# Patient Record
Sex: FEMALE | Race: AMERICAN INDIAN OR ALASKA NATIVE
[De-identification: names, ages, dates, MRNs, and addresses within clinical notes are randomized per-mention and may not be internally consistent; named-entity substitution may affect disease eponyms.]

---

## 2019-02-23 ENCOUNTER — HOSPITAL ENCOUNTER (EMERGENCY)
Dept: HOSPITAL 5 - ED | Age: 24
Discharge: HOME | End: 2019-02-23
Payer: MEDICAID

## 2019-02-23 VITALS — SYSTOLIC BLOOD PRESSURE: 123 MMHG | DIASTOLIC BLOOD PRESSURE: 72 MMHG

## 2019-02-23 DIAGNOSIS — O99.511: ICD-10-CM

## 2019-02-23 DIAGNOSIS — O23.41: Primary | ICD-10-CM

## 2019-02-23 DIAGNOSIS — N83.202: ICD-10-CM

## 2019-02-23 DIAGNOSIS — O34.81: ICD-10-CM

## 2019-02-23 DIAGNOSIS — Z3A.09: ICD-10-CM

## 2019-02-23 LAB
BACTERIA #/AREA URNS HPF: (no result) /HPF
BASOPHILS # (AUTO): 0 K/MM3 (ref 0–0.1)
BASOPHILS NFR BLD AUTO: 0.1 % (ref 0–1.8)
BILIRUB UR QL STRIP: (no result)
BLOOD UR QL VISUAL: (no result)
BUN SERPL-MCNC: 5 MG/DL (ref 7–17)
BUN/CREAT SERPL: 10 %
CALCIUM SERPL-MCNC: 9.3 MG/DL (ref 8.4–10.2)
EOSINOPHIL # BLD AUTO: 0 K/MM3 (ref 0–0.4)
EOSINOPHIL NFR BLD AUTO: 0.3 % (ref 0–4.3)
HCT VFR BLD CALC: 37.7 % (ref 30.3–42.9)
HEMOLYSIS INDEX: 9
HGB BLD-MCNC: 13.4 GM/DL (ref 10.1–14.3)
HYALINE CASTS #/AREA URNS LPF: 2 /LPF
LYMPHOCYTES # BLD AUTO: 1.5 K/MM3 (ref 1.2–5.4)
LYMPHOCYTES NFR BLD AUTO: 15.6 % (ref 13.4–35)
MCHC RBC AUTO-ENTMCNC: 36 % (ref 30–34)
MCV RBC AUTO: 94 FL (ref 79–97)
MONOCYTES # (AUTO): 0.8 K/MM3 (ref 0–0.8)
MONOCYTES % (AUTO): 8.8 % (ref 0–7.3)
MUCOUS THREADS #/AREA URNS HPF: (no result) /HPF
PH UR STRIP: 5 [PH] (ref 5–7)
PLATELET # BLD: 185 K/MM3 (ref 140–440)
RBC # BLD AUTO: 4.01 M/MM3 (ref 3.65–5.03)
RBC #/AREA URNS HPF: 10 /HPF (ref 0–6)
UROBILINOGEN UR-MCNC: 2 MG/DL (ref ?–2)
WBC #/AREA URNS HPF: 52 /HPF (ref 0–6)

## 2019-02-23 PROCEDURE — 76801 OB US < 14 WKS SINGLE FETUS: CPT

## 2019-02-23 PROCEDURE — 76817 TRANSVAGINAL US OBSTETRIC: CPT

## 2019-02-23 PROCEDURE — 96365 THER/PROPH/DIAG IV INF INIT: CPT

## 2019-02-23 PROCEDURE — 85025 COMPLETE CBC W/AUTO DIFF WBC: CPT

## 2019-02-23 PROCEDURE — 96361 HYDRATE IV INFUSION ADD-ON: CPT

## 2019-02-23 PROCEDURE — 36415 COLL VENOUS BLD VENIPUNCTURE: CPT

## 2019-02-23 PROCEDURE — 81001 URINALYSIS AUTO W/SCOPE: CPT

## 2019-02-23 PROCEDURE — 80048 BASIC METABOLIC PNL TOTAL CA: CPT

## 2019-02-23 PROCEDURE — 99284 EMERGENCY DEPT VISIT MOD MDM: CPT

## 2019-02-23 PROCEDURE — 84702 CHORIONIC GONADOTROPIN TEST: CPT

## 2019-02-23 NOTE — ULTRASOUND REPORT
FINAL REPORT



PROCEDURE:  US OB &lt; = 14 WEEKS FETUS



TECHNIQUE:  Real-time transabdominal sonography of the uterus, placenta, amniotic fluid, adnexa, and 
fetus was performed with image documentation. Measurements were obtained to determine fetal age/size.
 M-mode Doppler was used to document fetal heartbeat. CPT 33104 



HISTORY:  abd pain 



COMPARISON:  No prior studies are available for comparison.



FINDINGS:  

CRL: 31.9 mm, which corresponds to a gestational age of: 10 weeks, 0 days. 



Yolk Sac: Normal.



Embryonic Cardiac Activity: 167 beats per minute



Gestational Sac: Normal.



Amniotic fluid: Normal.



Cervix: Normal.



Right Ovary: Normal.



Left Ovary: There is a 7 millimeter cyst.



Estimated delivery date: 09/21/2019



Uterus and adnexa: Normal.



IMPRESSION:  

Single live intrauterine gestation at approximately 10 weeks. EDC by US 09/21/2019

## 2019-02-23 NOTE — EMERGENCY DEPARTMENT REPORT
ED Abdominal Pain HPI





- General


Chief Complaint: Abdominal Pain


Stated Complaint: EMESIS


Time Seen by Provider: 19 05:16


Source: patient


Mode of arrival: Ambulatory


Limitations: No Limitations





- History of Present Illness


Initial Comments: 


pt is a 22 y/o aaf G1, P0, A0 who presents for abd cramping with urinary 

frequency urgency for 1 week , there is no vaginal bleeding no pelvic pain no 

abnormal vaginal discharge pt states symptoms started after onset or URI 

constisting of head congestion rhinorrhea sinus pressure, and post nasal drip 

last n/v yesterday , n/v is has been consistent sense onset of pregnancy, there 

is no fever no chills , this is intermittent left flank pain and spasm with 

voiding, pt denies hematuria. 





MD Complaint: abdominal pain


Onset/Timin


-: week(s)


Location: LLQ


Radiation: suprapubic


Migration to: suprapubic


Severity: moderate


Severity scale (0 -10): 9


Quality: cramping, aching


Consistency: intermittent


Improves With: nothing


Worsens With: other (voiding)


Associated Symptoms: nausea, vomiting, dysuria





- Related Data


LMP (females 10-50): pregnant


                                  Previous Rx's











 Medication  Instructions  Recorded  Last Taken  Type


 


Acetaminophen [Tylenol Extra 1,000 mg PO TID PRN #30 tablet 19 Unknown Rx





Strength]    


 


Nitrofurantoin Monohyd/M-Cryst 100 mg PO BID 7 Days #14 capsule 19 Unknown

Rx





[Macrobid 100 mg Capsule]    











                                    Allergies











Allergy/AdvReac Type Severity Reaction Status Date / Time


 


No Known Allergies Allergy   Unverified 19 04:50














ED Review of Systems


ROS: 


Stated complaint: EMESIS


Other details as noted in HPI





Constitutional: denies: chills, fever


Eyes: denies: eye pain, eye discharge, vision change


ENT: denies: ear pain, throat pain


Respiratory: no symptoms reported


Cardiovascular: denies: chest pain, palpitations


Endocrine: no symptoms reported


Gastrointestinal: abdominal pain, nausea, vomiting


Genitourinary: urgency, dysuria, frequency


Musculoskeletal: back pain.  denies: joint swelling, arthralgia


Skin: denies: rash, lesions


Neurological: denies: headache, weakness, paresthesias


Psychiatric: denies: anxiety, depression


Hematological/Lymphatic: denies: easy bleeding, easy bruising





ED Past Medical Hx





- Past Medical History


Hx Asthma: Yes





- Surgical History


Past Surgical History?: No





- Social History


Smoking Status: Never Smoker


Substance Use Type: None





- Medications


Home Medications: 


                                Home Medications











 Medication  Instructions  Recorded  Confirmed  Last Taken  Type


 


Acetaminophen [Tylenol Extra 1,000 mg PO TID PRN #30 tablet 19  Unknown Rx





Strength]     


 


Nitrofurantoin Monohyd/M-Cryst 100 mg PO BID 7 Days #14 capsule 19  

Unknown Rx





[Macrobid 100 mg Capsule]     














ED Physical Exam





- General


Limitations: No Limitations


General appearance: alert, in no apparent distress





- Head


Head exam: Present: atraumatic, normocephalic





- Eye


Eye exam: Present: normal appearance, PERRL, EOMI


Pupils: Present: normal accommodation





- ENT


ENT exam: Present: normal orophraynx, mucous membranes moist, normal external 

ear exam





- Expanded ENT Exam


  ** Expanded


Ear exam: Present: normal external inspection


Throat exam: Positive: tonsillar erythema, other (uvula midline no stridor no 

exudate no lesions).  Negative: tonsillomegaly, tonsillar exudate, R 

peritonsillar mass, L peritonsillar mass





- Neck


Neck exam: Present: normal inspection, full ROM.  Absent: tenderness, 

meningismus, lymphadenopathy, thyromegaly





- Respiratory


Respiratory exam: Present: normal lung sounds bilaterally, chest wall tenderness

(right lateral chest wall tenderness with deep palpation and deep inspiration 

reproducible ).  Absent: respiratory distress, wheezes, rales, rhonchi, stridor,

decreased breath sounds, prolonged expiratory





- Cardiovascular


Cardiovascular Exam: Present: regular rate, normal rhythm, normal heart sounds. 

Absent: systolic murmur, diastolic murmur, rubs, gallop





- GI/Abdominal


GI/Abdominal exam: Present: soft, tenderness (LLQ), normal bowel sounds.  

Absent: guarding, rebound, rigid, bruit, hernia





- Rectal


Rectal exam: Present: deferred





- 


External exam: Present: other (deferred per patient )





- Extremities Exam


Extremities exam: Present: normal inspection, full ROM, normal capillary refill.

 Absent: tenderness, pedal edema, joint swelling, calf tenderness





- Back Exam


Back exam: Present: normal inspection, full ROM, CVA tenderness (L).  Absent: 

tenderness, CVA tenderness (R), muscle spasm, rash noted





- Neurological Exam


Neurological exam: Present: alert, oriented X3, CN II-XII intact, normal gait





- Psychiatric


Psychiatric exam: Present: normal affect





- Skin


Skin exam: Present: warm, dry, intact, normal color.  Absent: rash





ED Course


                                   Vital Signs











  19





  04:46


 


Temperature 98.3 F


 


Pulse Rate 87


 


Respiratory 16





Rate 


 


Blood Pressure 123/70


 


O2 Sat by Pulse 100





Oximetry 














ED Medical Decision Making





- Lab Data


Result diagrams: 


                                 19 05:01





                                 19 05:01





- Radiology Data


Radiology results: report reviewed, image reviewed


Single IUP 9 w and 2 days, FHR: 166 bmp, left ovarian cyst. 








- Medical Decision Making


US: single IUP, 9 weeks nd 2 days,  left ovarianc cyst,   UTI given rocephin 1 

gm ivpb in ed , will dc to home with rx for macrobid, tylenol , pt will followup

with Adeola Hawkins MD at [facility] in 2-3 days pt verbalized 

agreement and understanding of discharge plan 





Critical care attestation.: 


If time is entered above; I have spent that time in minutes in the direct care 

of this critically ill patient, excluding procedure time.








ED Disposition


Clinical Impression: 


UTI (urinary tract infection) during pregnancy


Qualifiers:


 Trimester: first trimester Qualified Code(s): O23.41 - Unspecified infection of

urinary tract in pregnancy, first trimester





Ovarian cyst


Qualifiers:


 Laterality: left Qualified Code(s): N83.202 - Unspecified ovarian cyst, left 

side





Disposition: DC-01 TO HOME OR SELFCARE


Is pt being admited?: No


Does the pt Need Aspirin: No


Condition: Stable


Instructions:  Abdominal Pain (ED), Urinary Tract Infection in Women (ED), Ova

ayala Cyst (ED)


Prescriptions: 


Acetaminophen [Tylenol Extra Strength] 1,000 mg PO TID PRN #30 tablet


 PRN Reason: pain


Nitrofurantoin Monohyd/M-Cryst [Macrobid 100 mg Capsule] 100 mg PO BID 7 Days 

#14 capsule


Referrals: 


CHONG HAWKINS MD [Staff Physician] - 3-5 Days


Forms:  Work/School Release Form(ED)


Time of Disposition: 07:30

## 2019-02-23 NOTE — ULTRASOUND REPORT
FINAL REPORT



PROCEDURE:  US OB TRANSVAGINAL 



TECHNIQUE:  Real-time transvaginal sonography of the uterus, placenta, amniotic fluid, adnexa, and fe
tus was performed with image documentation. Measurements were obtained to determine fetal age/size. M
-mode Doppler was used to document fetal heartbeat.



HISTORY:  abd pain 



COMPARISON:  No prior studies are available for comparison.



FINDINGS:  

CRL: 31.9 mm, which corresponds to a gestational age of: 10 weeks, 0 days. 



Yolk Sac: Normal.



Embryonic Cardiac Activity: 167 beats per minute



Gestational Sac: Normal.



Amniotic fluid: Normal.



Cervix: Normal.



Right Ovary: Normal.



Left Ovary: There is a 7 millimeter cyst.



Estimated delivery date: 09/21/2019



Uterus and adnexa: Normal.



IMPRESSION:  

Single live intrauterine gestation at approximately 10 weeks. EDC by US 09/21/2019

## 2019-03-16 ENCOUNTER — HOSPITAL ENCOUNTER (EMERGENCY)
Dept: HOSPITAL 5 - ED | Age: 24
Discharge: HOME | End: 2019-03-16
Payer: MEDICAID

## 2019-03-16 VITALS — SYSTOLIC BLOOD PRESSURE: 112 MMHG | DIASTOLIC BLOOD PRESSURE: 64 MMHG

## 2019-03-16 DIAGNOSIS — O21.8: ICD-10-CM

## 2019-03-16 DIAGNOSIS — R11.0: ICD-10-CM

## 2019-03-16 DIAGNOSIS — O26.891: Primary | ICD-10-CM

## 2019-03-16 DIAGNOSIS — Z3A.12: ICD-10-CM

## 2019-03-16 DIAGNOSIS — O99.511: ICD-10-CM

## 2019-03-16 LAB
BASOPHILS # (AUTO): 0 K/MM3 (ref 0–0.1)
BASOPHILS NFR BLD AUTO: 0.3 % (ref 0–1.8)
BUN SERPL-MCNC: 6 MG/DL (ref 7–17)
BUN/CREAT SERPL: 12 %
CALCIUM SERPL-MCNC: 9.4 MG/DL (ref 8.4–10.2)
EOSINOPHIL # BLD AUTO: 0.1 K/MM3 (ref 0–0.4)
EOSINOPHIL NFR BLD AUTO: 0.5 % (ref 0–4.3)
HCT VFR BLD CALC: 36.2 % (ref 30.3–42.9)
HEMOLYSIS INDEX: 1
HGB BLD-MCNC: 12.7 GM/DL (ref 10.1–14.3)
LYMPHOCYTES # BLD AUTO: 2 K/MM3 (ref 1.2–5.4)
LYMPHOCYTES NFR BLD AUTO: 20.5 % (ref 13.4–35)
MCHC RBC AUTO-ENTMCNC: 35 % (ref 30–34)
MCV RBC AUTO: 94 FL (ref 79–97)
MONOCYTES # (AUTO): 0.5 K/MM3 (ref 0–0.8)
MONOCYTES % (AUTO): 5.7 % (ref 0–7.3)
PLATELET # BLD: 192 K/MM3 (ref 140–440)
RBC # BLD AUTO: 3.85 M/MM3 (ref 3.65–5.03)

## 2019-03-16 PROCEDURE — 36415 COLL VENOUS BLD VENIPUNCTURE: CPT

## 2019-03-16 PROCEDURE — 84702 CHORIONIC GONADOTROPIN TEST: CPT

## 2019-03-16 PROCEDURE — 99283 EMERGENCY DEPT VISIT LOW MDM: CPT

## 2019-03-16 PROCEDURE — 80048 BASIC METABOLIC PNL TOTAL CA: CPT

## 2019-03-16 PROCEDURE — 96361 HYDRATE IV INFUSION ADD-ON: CPT

## 2019-03-16 PROCEDURE — 85025 COMPLETE CBC W/AUTO DIFF WBC: CPT

## 2019-03-16 PROCEDURE — 96374 THER/PROPH/DIAG INJ IV PUSH: CPT

## 2019-03-16 NOTE — EMERGENCY DEPARTMENT REPORT
ED Pregnancy HPI





- General


Chief complaint: Nausea/Vomiting/Diarrhea


Stated complaint: VOMITING/PREG X12WKS


Time Seen by Provider: 19 10:27


Source: patient


Mode of arrival: Ambulatory


Limitations: No Limitations





- History of Present Illness


Initial comments: 





24-year-old female presents to ED with nausea or vomiting.  Patient is 12 weeks 

pregnant, states it began 6 weeks ago.  Patient states she was seen for same 

symptoms previously and given a prescription for Zofran.  Ran out of Zofran 2 

weeks ago, states vomiting has continued.  She denies abdominal pain, vaginal 

bleeding.





OB: Kaci Villasenor MD Complaint: other (vomiting)


-: week(s) (2)


Severity: moderate


Improves with: medication


Worsens with: eating


Associated symptoms: denies: vaginal bleeding, abdominal pain


Vaginal bleeding: none


Pregnant:: Yes


Number of weeks pregnant: 12


Pre- care: other (1st appt 3/25)





- Related Data


                                  Previous Rx's











 Medication  Instructions  Recorded  Last Taken  Type


 


Acetaminophen [Tylenol Extra 1,000 mg PO TID PRN #30 tablet 19 Unknown Rx





Strength]    


 


Nitrofurantoin Monohyd/M-Cryst 100 mg PO BID 7 Days #14 capsule 19 Unknown

Rx





[Macrobid 100 mg Capsule]    


 


Ondansetron [Zofran Odt] 4 mg PO Q8HR PRN #20 tab.rapdis 19 Unknown Rx


 


Promethazine [Phenergan TAB] 25 mg PO Q6HR PRN #20 tab 19 Unknown Rx











                                    Allergies











Allergy/AdvReac Type Severity Reaction Status Date / Time


 


No Known Allergies Allergy   Verified 19 09:13














ED Review of Systems


ROS: 


Stated complaint: VOMITING/PREG X12WKS


Other details as noted in HPI





Comment: All other systems reviewed and negative


Constitutional: denies: fever


Gastrointestinal: nausea, vomiting.  denies: abdominal pain





ED Past Medical Hx





- Past Medical History


Hx Asthma: Yes





- Social History


Smoking Status: Never Smoker


Substance Use Type: None





- Medications


Home Medications: 


                                Home Medications











 Medication  Instructions  Recorded  Confirmed  Last Taken  Type


 


Acetaminophen [Tylenol Extra 1,000 mg PO TID PRN #30 tablet 19  Unknown Rx





Strength]     


 


Nitrofurantoin Monohyd/M-Cryst 100 mg PO BID 7 Days #14 capsule 19  

Unknown Rx





[Macrobid 100 mg Capsule]     


 


Ondansetron [Zofran Odt] 4 mg PO Q8HR PRN #20 tab.rapdis 19  Unknown Rx


 


Promethazine [Phenergan TAB] 25 mg PO Q6HR PRN #20 tab 19  Unknown Rx














ED Physical Exam





- General


Limitations: No Limitations


General appearance: alert, in no apparent distress





- Head


Head exam: Present: atraumatic, normocephalic





- Eye


Eye exam: Present: normal appearance





- ENT


ENT exam: Present: mucous membranes moist





- Neck


Neck exam: Present: normal inspection





- Respiratory


Respiratory exam: Present: normal lung sounds bilaterally.  Absent: respiratory 

distress





- Cardiovascular


Cardiovascular Exam: Present: regular rate, normal rhythm





- GI/Abdominal


GI/Abdominal exam: Present: soft.  Absent: distended, tenderness





- Extremities Exam


Extremities exam: Present: normal inspection





- Neurological Exam


Neurological exam: Present: alert, oriented X3





- Psychiatric


Psychiatric exam: Present: normal affect, normal mood





- Skin


Skin exam: Present: warm, dry, intact, normal color





ED Course


                                   Vital Signs











  19





  09:17


 


Temperature 98.4 F


 


Pulse Rate 91 H


 


Respiratory 18





Rate 


 


Blood Pressure 112/64


 


O2 Sat by Pulse 97





Oximetry 














ED Medical Decision Making





- Lab Data


Result diagrams: 


                                 19 10:49





                                 19 10:50





- Medical Decision Making





- labs unremarkable


- pt appears nontoxic


- IV fluids given


- no emesis here in ED


- advised clear liquid diet, OB f/u


- return precautions given





- Differential Diagnosis


hypokalemia, hyperemesis gravidarum, dehydration


Critical care attestation.: 


If time is entered above; I have spent that time in minutes in the direct care 

of this critically ill patient, excluding procedure time.








ED Disposition


Clinical Impression: 


 Nausea/vomiting in pregnancy





Disposition: DC-01 TO HOME OR SELFCARE


Is pt being admited?: No


Condition: Stable


Instructions:  Hyperemesis Gravidarum (ED)


Prescriptions: 


Promethazine [Phenergan TAB] 25 mg PO Q6HR PRN #20 tab


 PRN Reason: Nausea


Ondansetron [Zofran Odt] 4 mg PO Q8HR PRN #20 tab.rapdis


 PRN Reason: Vomiting


Referrals: 


[facility] [Other] - 3-5 Days


PRIMARY CARE,MD [Referring] - 3-5 Days


Time of Disposition: 11:30

## 2019-03-30 ENCOUNTER — HOSPITAL ENCOUNTER (EMERGENCY)
Dept: HOSPITAL 5 - ED | Age: 24
LOS: 1 days | Discharge: HOME | End: 2019-03-31
Payer: MEDICAID

## 2019-03-30 DIAGNOSIS — O26.892: ICD-10-CM

## 2019-03-30 DIAGNOSIS — J45.909: ICD-10-CM

## 2019-03-30 DIAGNOSIS — R10.2: ICD-10-CM

## 2019-03-30 DIAGNOSIS — O99.512: ICD-10-CM

## 2019-03-30 DIAGNOSIS — O26.852: ICD-10-CM

## 2019-03-30 DIAGNOSIS — O21.0: Primary | ICD-10-CM

## 2019-03-30 DIAGNOSIS — Z3A.15: ICD-10-CM

## 2019-03-30 LAB
BASOPHILS # (AUTO): 0 K/MM3 (ref 0–0.1)
BASOPHILS NFR BLD AUTO: 0.1 % (ref 0–1.8)
BUN SERPL-MCNC: 6 MG/DL (ref 7–17)
BUN/CREAT SERPL: 10 %
CALCIUM SERPL-MCNC: 9.6 MG/DL (ref 8.4–10.2)
EOSINOPHIL # BLD AUTO: 0 K/MM3 (ref 0–0.4)
EOSINOPHIL NFR BLD AUTO: 0.3 % (ref 0–4.3)
HCT VFR BLD CALC: 35.9 % (ref 30.3–42.9)
HEMOLYSIS INDEX: 1
HGB BLD-MCNC: 12.8 GM/DL (ref 10.1–14.3)
LYMPHOCYTES # BLD AUTO: 2.1 K/MM3 (ref 1.2–5.4)
LYMPHOCYTES NFR BLD AUTO: 20 % (ref 13.4–35)
MCHC RBC AUTO-ENTMCNC: 36 % (ref 30–34)
MCV RBC AUTO: 94 FL (ref 79–97)
MONOCYTES # (AUTO): 0.6 K/MM3 (ref 0–0.8)
MONOCYTES % (AUTO): 6 % (ref 0–7.3)
PLATELET # BLD: 192 K/MM3 (ref 140–440)
RBC # BLD AUTO: 3.84 M/MM3 (ref 3.65–5.03)

## 2019-03-30 PROCEDURE — 76805 OB US >/= 14 WKS SNGL FETUS: CPT

## 2019-03-30 PROCEDURE — 36415 COLL VENOUS BLD VENIPUNCTURE: CPT

## 2019-03-30 PROCEDURE — 96374 THER/PROPH/DIAG INJ IV PUSH: CPT

## 2019-03-30 PROCEDURE — 96376 TX/PRO/DX INJ SAME DRUG ADON: CPT

## 2019-03-30 PROCEDURE — 80048 BASIC METABOLIC PNL TOTAL CA: CPT

## 2019-03-30 PROCEDURE — 81001 URINALYSIS AUTO W/SCOPE: CPT

## 2019-03-30 PROCEDURE — 84702 CHORIONIC GONADOTROPIN TEST: CPT

## 2019-03-30 PROCEDURE — 85025 COMPLETE CBC W/AUTO DIFF WBC: CPT

## 2019-03-30 PROCEDURE — 99284 EMERGENCY DEPT VISIT MOD MDM: CPT

## 2019-03-30 PROCEDURE — 96361 HYDRATE IV INFUSION ADD-ON: CPT

## 2019-03-30 NOTE — EMERGENCY DEPARTMENT REPORT
HPI





- HPI


HPI: 





24-year-old -American female who is  at 14 weeks pregnant presents 

to the emergency department with complaint of nausea, vomiting and dehydration. 

The patient says that she has not had a bowel movement in about 3 weeks as she 

has not been eating much.  She was here about a week ago and discharged home 

with some Zofran ODT and she has been using this without much relief.  She has 

some mild intermittent abdominal and pelvic pains and some intermittent 

spotting.  Her OB/GYN service is [facility]. No recent travel or sick 

contacts at home.





<LUISITO DAWN - Last Filed: 19 01:58>





<NATHAN MONAE - Last Filed: 19 03:12>





- General


Chief Complaint: Dizziness


Time Seen by Provider: 19 23:19





ED Past Medical Hx





- Past Medical History


Previous Medical History?: Yes


Hx Asthma: Yes





- Surgical History


Past Surgical History?: Yes





- Social History


Smoking Status: Never Smoker


Substance Use Type: None





<LUISITO DAWN - Last Filed: 19 01:58>





<NATHAN MONAE - Last Filed: 19 03:12>





- Medications


Home Medications: 


                                Home Medications











 Medication  Instructions  Recorded  Confirmed  Last Taken  Type


 


Acetaminophen [Tylenol Extra 1,000 mg PO TID PRN #30 tablet 19  Unknown Rx





Strength]     


 


Nitrofurantoin Monohyd/M-Cryst 100 mg PO BID 7 Days #14 capsule 19  

Unknown Rx





[Macrobid 100 mg Capsule]     


 


Promethazine [Phenergan TAB] 25 mg PO Q6HR PRN #20 tab 19  Unknown Rx


 


Nitrofurantoin Monohyd/M-Cryst 100 mg PO BID #14 capsule 19  Unknown Rx





[Macrobid 100 mg Capsule]     


 


Ondansetron [Zofran ODT TAB] 4 mg PO Q8HR PRN #15 tab.rapdis 19  Unknown 

Rx














ED Review of Systems


ROS: 


Stated complaint: LIGHT HEADED, VOMITING, ABD PAIN


Other details as noted in HPI





Comment: All other systems reviewed and negative


Constitutional: denies: chills, fever


Eyes: denies: eye pain, vision change


ENT: denies: ear pain, throat pain


Respiratory: denies: cough, shortness of breath


Cardiovascular: denies: chest pain, palpitations


Gastrointestinal: abdominal pain, nausea, vomiting, constipation


Genitourinary: other (intermitten spotting).  denies: dysuria, discharge


Musculoskeletal: denies: back pain, arthralgia


Skin: denies: rash, lesions


Neurological: denies: headache, weakness





<LUISITO DAWN S - Last Filed: 19 01:58>


ROS: 


Stated complaint: LIGHT HEADED, VOMITING, ABD PAIN


Other details as noted in HPI








<NATHAN MONAE - Last Filed: 19 03:12>





Physical Exam





- Physical Exam


Vital Signs: 


                                   Vital Signs











  19





  21:14


 


Temperature 98.0 F


 


Pulse Rate 79


 


Respiratory 18





Rate 


 


Blood Pressure 116/74


 


O2 Sat by Pulse 95





Oximetry 











Physical Exam: 





GENERAL: The patient is well-developed well-nourished.


HEENT: Normocephalic.  Atraumatic.   Patient has moist mucous membranes.


EYES:  Extraocular motions are intact.  


NECK: Supple.  Trachea is midline.


CHEST/LUNGS: Clear to auscultation.  There is no respiratory distress noted.  


HEART/CARDIOVASCULAR: Regular.  There is no tachycardia.  There is no obvious 

murmur.


ABDOMEN: Abdomen is soft, nontender.  Patient has normal bowel sounds.  There is

no abdominal distention.


SKIN: Skin is warm and dry.


NEURO: The patient is awake, alert, and oriented.  The patient is cooperative.  

The patient has no focal neurologic deficits.  The patient has normal speech.


MUSCULOSKELETAL: There is no tenderness or deformity.  There is no limitation 

range of motion.  There is no evidence of acute injury.





<LUISITO DAWN S - Last Filed: 19 01:58>





- Physical Exam


Vital Signs: 


                                   Vital Signs











  19





  21:14 00:41 00:42


 


Temperature 98.0 F 98.4 F 


 


Pulse Rate 79 85 


 


Respiratory 18 20 20





Rate   


 


Blood Pressure 116/74  


 


Blood Pressure  132/65 





[Left]   


 


O2 Sat by Pulse 95 99 99





Oximetry   














<NATHAN MONAE - Last Filed: 19 03:12>





ED Course


                                   Vital Signs











  19





  21:14


 


Temperature 98.0 F


 


Pulse Rate 79


 


Respiratory 18





Rate 


 


Blood Pressure 116/74


 


O2 Sat by Pulse 95





Oximetry 














<LUISITO DAWN S - Last Filed: 03/31/19 01:58>


                                   Vital Signs











  19





  21:14 00:41 00:42


 


Temperature 98.0 F 98.4 F 


 


Pulse Rate 79 85 


 


Respiratory 18 20 20





Rate   


 


Blood Pressure 116/74  


 


Blood Pressure  132/65 





[Left]   


 


O2 Sat by Pulse 95 99 99





Oximetry   














<NATHAN MONAE - Last Filed: 19 03:12>





ED Medical Decision Making





- Lab Data


Result diagrams: 


                                 19 21:58





                                 19 21:58





- Radiology Data


Radiology results: report reviewed





PROCEDURE: US OB >= 14 WEEKS FETUS 





TECHNIQUE: Real-time transabdominal sonography of the uterus, placenta, amniotic

 fluid, adnexa, 


and fetus was performed with image documentation. Measurements were obtained to 

determine fetal 


age/size. M-mode Doppler was used to document fetal heartbeat. 





ADDITIONAL GESTATION: None 





HISTORY: abd pain, pregnant 





COMPARISONS: None. 





FINDINGS: 





MATERNAL: 


Uterus and cervix: The cervix is closed measures 3.2 cm in length. 


Adnexa and ovaries: Not visualized. 





IUP: Single live intrauterine gestation. 


Fetal Position: Vertex 


Placental position: Posterior, without previa . 


Amniotic fluid volume Normal. 


Cardiac activity: Regular rhythm at 150 bpm. 





FETAL BIOMETRY: 


Biparietal diameter: 2.9 cm. 


Head circumference: 10.8 cm. 


abdominal circumference: 9.1 cm. 


Femur length: 1.7 cm. 





Mean Gestational Age (composite criteria) based on today's measurements: 15 

weeks 1 day. 





Estimated Due Date (earliest scan): 2019. 





IMPRESSION: 





Single live intrauterine gestation at 15 weeks 1 day. Estimated due date: 

2019. 





No fetal anatomic abnormality. 








This document is electronically signed by Lisbet Monae DO., 2019 

12:54:36 AM ET 





Transcribed By: [facility] 


Dictated By: LISBET MONAE MD 


Electronically Authenticated By: LISBET MONAE MD 


Signed Date/Time: 19 0056 








- Medical Decision Making





This patient presents to the emergency department with complaint of nausea and 

vomiting during pregnancy.  Since being in the emergency Department there has 

been no further vomiting.  Labs have been unremarkable thus far including a CBC 

and CMP.  HCG is about 30,000.  Ultrasound shows a live intrauterine pregnancy 

at about 15 weeks.  She was given some IV fluid resuscitation and nausea medica

tion.  She has passed an oral challenge.  She appears safe for discharge home at

 this time to follow up with her OB/GYN.  She will return to the ER with any 

worsening of her symptoms or any acute distress.





<LUISITO DAWN - Last Filed: 19 01:58>





- Lab Data


Result diagrams: 


                                 19 21:58





                                 19 21:58





- Medical Decision Making





I reviewed urinalysis.  Urinalysis appears to be contaminated.  No overt signs 

of infection.  Antibiotics are not indicated in this scenario.  I have updated 

the patient.  Discharged home in stable condition.





<NATHAN MONAE - Last Filed: 19 03:12>


Critical Care Time: No


Critical care attestation.: 


If time is entered above; I have spent that time in minutes in the direct care 

of this critically ill patient, excluding procedure time.








<LUISITO DAWN - Last Filed: 19 01:58>


Critical care attestation.: 


If time is entered above; I have spent that time in minutes in the direct care 

of this critically ill patient, excluding procedure time.








<NATHAN MONAE - Last Filed: 19 03:12>





ED Disposition


Is pt being admited?: No





<LUISITO DAWN - Last Filed: 19 01:58>


Is pt being admited?: No





<MYANATHAN - Last Filed: 19 03:12>


Clinical Impression: 


 Hyperemesis gravidarum, Nausea and vomiting during pregnancy





Disposition: - TO HOME OR SELFCARE


Condition: Stable


Instructions:  Hyperemesis Gravidarum (ED)


Additional Instructions: 


Increase your oral rehydration.  Please follow-up with your OB/GYN in the next 

few days.  Return to the emergency Department with any worsening of your 

symptoms, especially if you're unable to stay hydrated, with any acute distress.


Prescriptions: 


Ondansetron [Zofran ODT TAB] 4 mg PO Q8HR PRN #15 tab.rapdis


 PRN Reason: Vomiting


Referrals: 


[facility] [Provider Group] - 3-5 Days

## 2019-03-31 VITALS — DIASTOLIC BLOOD PRESSURE: 63 MMHG | SYSTOLIC BLOOD PRESSURE: 110 MMHG

## 2019-03-31 LAB
BACTERIA #/AREA URNS HPF: (no result) /HPF
BILIRUB UR QL STRIP: (no result)
BLOOD UR QL VISUAL: (no result)
MUCOUS THREADS #/AREA URNS HPF: (no result) /HPF
PH UR STRIP: 5 [PH] (ref 5–7)
RBC #/AREA URNS HPF: 3 /HPF (ref 0–6)
UROBILINOGEN UR-MCNC: 4 MG/DL (ref ?–2)
WBC #/AREA URNS HPF: 5 /HPF (ref 0–6)

## 2019-03-31 NOTE — ULTRASOUND REPORT
PROCEDURE: US OB >= 14 WEEKS FETUS 

 

TECHNIQUE:  Real-time transabdominal sonography of the uterus, placenta, amniotic fluid, adnexa, and 
fetus was performed with image documentation. Measurements were obtained to determine fetal age/size.
 M-mode Doppler was used to document fetal heartbeat.  

 

ADDITIONAL GESTATION: None 

 

HISTORY:  abd pain, pregnant  

 

COMPARISONS:  None. 

 

FINDINGS: 

 

MATERNAL: 

Uterus and cervix: The cervix is closed measures  3.2 cm in length. 

Adnexa and ovaries: Not visualized. 

 

IUP:  Single live intrauterine gestation.   

Fetal Position:  Vertex 

Placental position:  Posterior, without previa .   

Amniotic fluid volume Normal.  

Cardiac activity: Regular rhythm at 150 bpm. 

 

FETAL BIOMETRY: 

Biparietal diameter:  2.9 cm. 

Head circumference:  10.8 cm. 

abdominal circumference:  9.1 cm. 

Femur length:  1.7 cm. 

 

Mean Gestational Age (composite criteria) based on today's measurements:  15 weeks 1 day.   

 

Estimated Due Date (earliest scan):  9/21/2019. 

 

IMPRESSION:   

 

Single live intrauterine gestation at 15 weeks 1 day.  Estimated due date:  5/21/2019. 

 

No fetal anatomic abnormality. 

 

 

This document is electronically signed by Lisbet Monae DO., March 31 2019 12:54:36 AM ET

## 2019-06-22 ENCOUNTER — HOSPITAL ENCOUNTER (EMERGENCY)
Dept: HOSPITAL 5 - ED | Age: 24
Discharge: HOME | End: 2019-06-22
Payer: MEDICAID

## 2019-06-22 VITALS — DIASTOLIC BLOOD PRESSURE: 58 MMHG | SYSTOLIC BLOOD PRESSURE: 116 MMHG

## 2019-06-22 DIAGNOSIS — O99.512: ICD-10-CM

## 2019-06-22 DIAGNOSIS — Z3A.24: ICD-10-CM

## 2019-06-22 DIAGNOSIS — Z79.899: ICD-10-CM

## 2019-06-22 DIAGNOSIS — O22.42: Primary | ICD-10-CM

## 2019-06-22 DIAGNOSIS — J45.909: ICD-10-CM

## 2019-06-22 PROCEDURE — 99282 EMERGENCY DEPT VISIT SF MDM: CPT

## 2019-06-22 NOTE — EMERGENCY DEPARTMENT REPORT
ED General Adult HPI





- General


Chief complaint: Abdominal Pain


Stated complaint: PREGNANT/HERNIA/HEMORRHOID PAIN


Time Seen by Provider: 06/22/19 10:59


Source: patient


Mode of arrival: Ambulatory


Limitations: No Limitations





- History of Present Illness


Initial comments: 





Pt is a 23 yo female who presents with rectal pain that began yesterday. she 

states that she feels an area of swelling around the rectum. she is currently 6 

months pregnancy. This is the patients first pregnancy. she denies any vaginal 

bleeding, rectal bleeding, or abd pain. PMHx of asthma. no allergies to meds. 

she states she called her OB/GYN and they advised to use preparation H, she 

states she wanted to have it looked at in the ER first. pt has not yet used any 

treatment. 





- Related Data


                                  Previous Rx's











 Medication  Instructions  Recorded  Last Taken  Type


 


Acetaminophen [Tylenol Extra 1,000 mg PO TID PRN #30 tablet 02/23/19 Unknown Rx





Strength]    


 


Nitrofurantoin Monohyd/M-Cryst 100 mg PO BID 7 Days #14 capsule 02/23/19 Unknown

Rx





[Macrobid 100 mg Capsule]    


 


Promethazine [Phenergan TAB] 25 mg PO Q6HR PRN #20 tab 03/16/19 Unknown Rx


 


Nitrofurantoin Monohyd/M-Cryst 100 mg PO BID #14 capsule 03/23/19 Unknown Rx





[Macrobid 100 mg Capsule]    


 


Ondansetron [Zofran ODT TAB] 4 mg PO Q8HR PRN #15 tab.rapdis 03/31/19 Unknown Rx


 


Docusate Sodium [Colace] 100 mg PO BID PRN #14 capsule 06/22/19 Unknown Rx


 


Phenyleph/Mineral Oil/Petrolat 2 gm RC QID #1 oint.appl 06/22/19 Unknown Rx





[Preparation H Ointment]    











                                    Allergies











Allergy/AdvReac Type Severity Reaction Status Date / Time


 


No Known Allergies Allergy   Verified 06/22/19 10:48














ED Review of Systems


ROS: 


Stated complaint: PREGNANT/HERNIA/HEMORRHOID PAIN


Other details as noted in HPI





Comment: All other systems reviewed and negative





ED Past Medical Hx





- Past Medical History


Hx Asthma: Yes





- Surgical History


Past Surgical History?: No





- Social History


Smoking Status: Never Smoker





- Medications


Home Medications: 


                                Home Medications











 Medication  Instructions  Recorded  Confirmed  Last Taken  Type


 


Acetaminophen [Tylenol Extra 1,000 mg PO TID PRN #30 tablet 02/23/19  Unknown Rx





Strength]     


 


Nitrofurantoin Monohyd/M-Cryst 100 mg PO BID 7 Days #14 capsule 02/23/19  Unkn

own Rx





[Macrobid 100 mg Capsule]     


 


Promethazine [Phenergan TAB] 25 mg PO Q6HR PRN #20 tab 03/16/19  Unknown Rx


 


Nitrofurantoin Monohyd/M-Cryst 100 mg PO BID #14 capsule 03/23/19  Unknown Rx





[Macrobid 100 mg Capsule]     


 


Ondansetron [Zofran ODT TAB] 4 mg PO Q8HR PRN #15 tab.rapdis 03/31/19  Unknown 

Rx


 


Docusate Sodium [Colace] 100 mg PO BID PRN #14 capsule 06/22/19  Unknown Rx


 


Phenyleph/Mineral Oil/Petrolat 2 gm RC QID #1 oint.appl 06/22/19  Unknown Rx





[Preparation H Ointment]     














ED Physical Exam





- General


Limitations: No Limitations


General appearance: alert, in no apparent distress





- Head


Head exam: Present: atraumatic, normocephalic





- Eye


Eye exam: Present: normal appearance, PERRL





- ENT


ENT exam: Present: mucous membranes moist





- Respiratory


Respiratory exam: Absent: respiratory distress





- GI/Abdominal


GI/Abdominal exam: Present: soft, other (gravid abdomen).  Absent: tenderness, 

guarding, rebound, rigid





- Rectal


Rectal exam: Present: normal rectal tone, hemorrhoids (1 cm external hemorrhoid 

at the 2 oclock position, non thrombosed, brown stool, no visualized blood, no 

internal hemorrhoids ), other (chaperone: ADA Jensen)





- Neurological Exam


Neurological exam: Present: alert, oriented X3





- Psychiatric


Psychiatric exam: Present: normal affect, normal mood





- Skin


Skin exam: Present: warm, dry, intact





ED Course


                                   Vital Signs











  06/22/19





  10:52


 


Temperature 97.8 F


 


Pulse Rate 98 H


 


Respiratory 18





Rate 


 


Blood Pressure 116/58


 


O2 Sat by Pulse 96





Oximetry 














ED Medical Decision Making





- Medical Decision Making





Pt is a 23 yo female who presents with rectal pain that began yesterday. she 

states that she feels an area of swelling around the rectum. she is currently 6 

months pregnancy. This is the patients first pregnancy. she denies any vaginal 

bleeding, rectal bleeding, or abd pain. PMHx of asthma. no allergies to meds. 

she states she called her OB/GYN and they advised to use preparation H, she 

states she wanted to have it looked at in the ER first. pt has not yet used any 

treatment. on exam: 1 cm external hemorrhoid at the 2 oclock position, non 

thrombosed, brown stool, no visualized blood, no internal hemorrhoids, 

chaperone: ADA jensen. pt given prescription for colace and preparation H. 

discussed to please use medication as prescribed. advised to please do a sitz 

bath three times a day. follow up with a primary care doctor and Ob/GYN. return 

to the emergency room for any new or worsening symptoms.


Critical care attestation.: 


If time is entered above; I have spent that time in minutes in the direct care 

of this critically ill patient, excluding procedure time.








ED Disposition


Clinical Impression: 


 External hemorrhoid





Disposition: DC-01 TO HOME OR SELFCARE


Is pt being admited?: No


Does the pt Need Aspirin: No


Condition: Stable


Instructions:  Hemorrhoids (ED), Sitz Bath (GEN)


Additional Instructions: 


Please use medication as prescribed. please do a sitz bath three times a day. 

follow up with a primary care doctor and Ob/GYN. return to the emergency room 

for any new or worsening symptoms. 


Prescriptions: 


Docusate Sodium [Colace] 100 mg PO BID PRN #14 capsule


 PRN Reason: Constipation


Phenyleph/Mineral Oil/Petrolat [Preparation H Ointment] 2 gm RC QID #1 oint.appl


Referrals: 


[facility] [Primary Care Provider] - 2-3 Days


your, OB/GYN [Other] - 2-3 Days


Time of Disposition: 11:19


Print Language: ENGLISH

## 2019-06-23 ENCOUNTER — HOSPITAL ENCOUNTER (OUTPATIENT)
Dept: HOSPITAL 5 - TRG | Age: 24
LOS: 1 days | Discharge: HOME | End: 2019-06-24
Attending: OBSTETRICS & GYNECOLOGY
Payer: MEDICAID

## 2019-06-23 DIAGNOSIS — R10.2: ICD-10-CM

## 2019-06-23 DIAGNOSIS — O99.512: ICD-10-CM

## 2019-06-23 DIAGNOSIS — O47.02: ICD-10-CM

## 2019-06-23 DIAGNOSIS — F41.9: ICD-10-CM

## 2019-06-23 DIAGNOSIS — Z3A.26: ICD-10-CM

## 2019-06-23 DIAGNOSIS — O99.342: ICD-10-CM

## 2019-06-23 DIAGNOSIS — F32.9: ICD-10-CM

## 2019-06-23 DIAGNOSIS — G43.909: ICD-10-CM

## 2019-06-23 DIAGNOSIS — R35.0: ICD-10-CM

## 2019-06-23 DIAGNOSIS — J45.909: ICD-10-CM

## 2019-06-23 DIAGNOSIS — O26.892: Primary | ICD-10-CM

## 2019-06-23 DIAGNOSIS — O99.352: ICD-10-CM

## 2019-06-23 DIAGNOSIS — R63.0: ICD-10-CM

## 2019-06-23 PROCEDURE — 81001 URINALYSIS AUTO W/SCOPE: CPT

## 2019-06-23 PROCEDURE — 96360 HYDRATION IV INFUSION INIT: CPT

## 2019-06-23 PROCEDURE — 96361 HYDRATE IV INFUSION ADD-ON: CPT

## 2019-06-23 PROCEDURE — 96365 THER/PROPH/DIAG IV INF INIT: CPT

## 2019-06-24 VITALS — SYSTOLIC BLOOD PRESSURE: 114 MMHG | DIASTOLIC BLOOD PRESSURE: 63 MMHG

## 2019-06-24 LAB
BILIRUB UR QL STRIP: (no result)
BLOOD UR QL VISUAL: (no result)
MUCOUS THREADS #/AREA URNS HPF: (no result) /HPF
PH UR STRIP: 6 [PH] (ref 5–7)
RBC #/AREA URNS HPF: 42 /HPF (ref 0–6)
UROBILINOGEN UR-MCNC: 4 MG/DL (ref ?–2)
WBC #/AREA URNS HPF: 3 /HPF (ref 0–6)

## 2019-07-15 ENCOUNTER — HOSPITAL ENCOUNTER (OUTPATIENT)
Dept: HOSPITAL 5 - TRG | Age: 24
Discharge: HOME | End: 2019-07-15
Attending: OBSTETRICS & GYNECOLOGY
Payer: MEDICAID

## 2019-07-15 VITALS — SYSTOLIC BLOOD PRESSURE: 124 MMHG | DIASTOLIC BLOOD PRESSURE: 67 MMHG

## 2019-07-15 DIAGNOSIS — J45.909: ICD-10-CM

## 2019-07-15 DIAGNOSIS — O99.343: ICD-10-CM

## 2019-07-15 DIAGNOSIS — O99.353: ICD-10-CM

## 2019-07-15 DIAGNOSIS — O99.513: ICD-10-CM

## 2019-07-15 DIAGNOSIS — G43.909: ICD-10-CM

## 2019-07-15 DIAGNOSIS — Z3A.30: ICD-10-CM

## 2019-07-15 DIAGNOSIS — O60.03: Primary | ICD-10-CM

## 2019-07-15 DIAGNOSIS — F32.9: ICD-10-CM

## 2019-07-15 DIAGNOSIS — F41.9: ICD-10-CM

## 2019-07-15 LAB
BACTERIA #/AREA URNS HPF: (no result) /HPF
BILIRUB UR QL STRIP: (no result)
BLOOD UR QL VISUAL: (no result)
MUCOUS THREADS #/AREA URNS HPF: (no result) /HPF
PH UR STRIP: 8 [PH] (ref 5–7)
PROT UR STRIP-MCNC: (no result) MG/DL
RBC #/AREA URNS HPF: 3 /HPF (ref 0–6)
UROBILINOGEN UR-MCNC: < 2 MG/DL (ref ?–2)
WBC #/AREA URNS HPF: 11 /HPF (ref 0–6)

## 2019-07-15 PROCEDURE — 96361 HYDRATE IV INFUSION ADD-ON: CPT

## 2019-07-15 PROCEDURE — 81001 URINALYSIS AUTO W/SCOPE: CPT

## 2019-07-15 PROCEDURE — 96372 THER/PROPH/DIAG INJ SC/IM: CPT

## 2019-07-15 PROCEDURE — 96360 HYDRATION IV INFUSION INIT: CPT

## 2019-07-15 PROCEDURE — 87086 URINE CULTURE/COLONY COUNT: CPT

## 2019-07-15 PROCEDURE — 59025 FETAL NON-STRESS TEST: CPT

## 2019-11-02 ENCOUNTER — HOSPITAL ENCOUNTER (EMERGENCY)
Dept: HOSPITAL 5 - ED | Age: 24
Discharge: LEFT BEFORE DISCHARGE | End: 2019-11-02
Payer: MEDICAID

## 2019-11-02 VITALS — SYSTOLIC BLOOD PRESSURE: 111 MMHG | DIASTOLIC BLOOD PRESSURE: 71 MMHG

## 2019-11-02 DIAGNOSIS — R10.31: Primary | ICD-10-CM

## 2019-11-02 DIAGNOSIS — Z53.21: ICD-10-CM

## 2021-04-02 ENCOUNTER — HOSPITAL ENCOUNTER (EMERGENCY)
Dept: HOSPITAL 5 - ED | Age: 26
Discharge: HOME | End: 2021-04-02
Payer: MEDICAID

## 2021-04-02 VITALS — DIASTOLIC BLOOD PRESSURE: 72 MMHG | SYSTOLIC BLOOD PRESSURE: 115 MMHG

## 2021-04-02 DIAGNOSIS — Z79.899: ICD-10-CM

## 2021-04-02 DIAGNOSIS — F17.200: ICD-10-CM

## 2021-04-02 DIAGNOSIS — F12.10: ICD-10-CM

## 2021-04-02 DIAGNOSIS — Z79.2: ICD-10-CM

## 2021-04-02 DIAGNOSIS — Z79.1: ICD-10-CM

## 2021-04-02 DIAGNOSIS — J02.9: Primary | ICD-10-CM

## 2021-04-02 DIAGNOSIS — J45.909: ICD-10-CM

## 2021-04-02 PROCEDURE — 99282 EMERGENCY DEPT VISIT SF MDM: CPT

## 2021-04-02 NOTE — EMERGENCY DEPARTMENT REPORT
ED ENT HPI





- General


Chief complaint: Dental/Oral


Stated complaint: RT SIDE JAW PAIN/SWOLLEN


Time Seen by Provider: 04/02/21 05:25


Source: patient


Mode of arrival: Ambulatory


Limitations: No Limitations





- History of Present Illness


Initial comments: 





26-year-old -American female presents to the emergency room complaining 

of right-sided mouth and jaw pain since about 10 PM yesterday.  Patient states 

she is aware that she has poor dental care.  She denies any fever no chills no 

nausea no vomiting.  Patient taking nothing prior to arrival for her pain.  


MD complaint: sore throat


-: Last night


Time: 22:00


Location: throat


Severity: severe


Severity scale (0 -10): 10


Quality: stabbing, sharp


Consistency: constant


Improves with: other medication (Tylenol)


Worsens with: swallowing


Associated Symptoms: pain with swallowing, sore throat.  denies: fever, cough, 

gum swelling, toothache, tinnitus, hearing loss, discharge from ear, rhinorrhea





- Related Data


                                  Previous Rx's











 Medication  Instructions  Recorded  Last Taken  Type


 


Acetaminophen [Tylenol Extra 1,000 mg PO TID PRN #30 tablet 02/23/19 Unknown Rx





Strength]    


 


Nitrofurantoin Monohyd/M-Cryst 100 mg PO BID 7 Days #14 capsule 02/23/19 Unknown

Rx





[Macrobid 100 mg Capsule]    


 


Promethazine [Phenergan TAB] 25 mg PO Q6HR PRN #20 tab 03/16/19 Unknown Rx


 


Nitrofurantoin Monohyd/M-Cryst 100 mg PO BID #14 capsule 03/23/19 Unknown Rx





[Macrobid 100 mg Capsule]    


 


Ondansetron [Zofran ODT TAB] 4 mg PO Q8HR PRN #15 tab.rapdis 03/31/19 Unknown Rx


 


Docusate Sodium [Colace] 100 mg PO BID PRN #14 capsule 06/22/19 Unknown Rx


 


Phenyleph/Mineral Oil/Petrolat 2 gm RC QID #1 oint.appl 06/22/19 Unknown Rx





[Preparation H Ointment]    


 


Amoxicillin/K Clav Tab [Augmentin 1 tab PO Q12HR 7 Days #14 tab 04/02/21 Unknown

 Rx





875 mg]    


 


Ibuprofen [Motrin 600 MG tab] 600 mg PO Q8H PRN #30 tablet 04/02/21 Unknown Rx











                                    Allergies











Allergy/AdvReac Type Severity Reaction Status Date / Time


 


No Known Allergies Allergy   Verified 06/22/19 10:48














ED Dental HPI





- General


Chief complaint: Dental/Oral


Stated complaint: RT SIDE JAW PAIN/SWOLLEN


Time Seen by Provider: 04/02/21 05:25


Source: patient


Mode of arrival: Ambulatory


Limitations: No Limitations





- Related Data


                                  Previous Rx's











 Medication  Instructions  Recorded  Last Taken  Type


 


Acetaminophen [Tylenol Extra 1,000 mg PO TID PRN #30 tablet 02/23/19 Unknown Rx





Strength]    


 


Nitrofurantoin Monohyd/M-Cryst 100 mg PO BID 7 Days #14 capsule 02/23/19 Unknown

Rx





[Macrobid 100 mg Capsule]    


 


Promethazine [Phenergan TAB] 25 mg PO Q6HR PRN #20 tab 03/16/19 Unknown Rx


 


Nitrofurantoin Monohyd/M-Cryst 100 mg PO BID #14 capsule 03/23/19 Unknown Rx





[Macrobid 100 mg Capsule]    


 


Ondansetron [Zofran ODT TAB] 4 mg PO Q8HR PRN #15 tab.rapdis 03/31/19 Unknown Rx


 


Docusate Sodium [Colace] 100 mg PO BID PRN #14 capsule 06/22/19 Unknown Rx


 


Phenyleph/Mineral Oil/Petrolat 2 gm RC QID #1 oint.appl 06/22/19 Unknown Rx





[Preparation H Ointment]    


 


Amoxicillin/K Clav Tab [Augmentin 1 tab PO Q12HR 7 Days #14 tab 04/02/21 Unknown

 Rx





875 mg]    


 


Ibuprofen [Motrin 600 MG tab] 600 mg PO Q8H PRN #30 tablet 04/02/21 Unknown Rx











                                    Allergies











Allergy/AdvReac Type Severity Reaction Status Date / Time


 


No Known Allergies Allergy   Verified 06/22/19 10:48














ED Review of Systems


ROS: 


Stated complaint: RT SIDE JAW PAIN/SWOLLEN


Other details as noted in HPI





Comment: All other systems reviewed and negative





ED Past Medical Hx





- Past Medical History


Previous Medical History?: Yes


Hx Hypertension: No


Hx Diabetes: No


Hx Deep Vein Thrombosis: No


Hx Renal Disease: No


Hx Sickle Cell Disease: No


Hx Seizures: No


Hx Asthma: Yes


Hx HIV: No





- Surgical History


Past Surgical History?: No





- Social History


Smoking Status: Current Some Day Smoker


Substance Use Type: Marijuana





- Medications


Home Medications: 


                                Home Medications











 Medication  Instructions  Recorded  Confirmed  Last Taken  Type


 


Acetaminophen [Tylenol Extra 1,000 mg PO TID PRN #30 tablet 02/23/19  Unknown Rx





Strength]     


 


Nitrofurantoin Monohyd/M-Cryst 100 mg PO BID 7 Days #14 capsule 02/23/19  

Unknown Rx





[Macrobid 100 mg Capsule]     


 


Promethazine [Phenergan TAB] 25 mg PO Q6HR PRN #20 tab 03/16/19  Unknown Rx


 


Nitrofurantoin Monohyd/M-Cryst 100 mg PO BID #14 capsule 03/23/19  Unknown Rx





[Macrobid 100 mg Capsule]     


 


Ondansetron [Zofran ODT TAB] 4 mg PO Q8HR PRN #15 tab.rapdis 03/31/19  Unknown 

Rx


 


Docusate Sodium [Colace] 100 mg PO BID PRN #14 capsule 06/22/19  Unknown Rx


 


Phenyleph/Mineral Oil/Petrolat 2 gm RC QID #1 oint.appl 06/22/19  Unknown Rx





[Preparation H Ointment]     


 


Amoxicillin/K Clav Tab [Augmentin 1 tab PO Q12HR 7 Days #14 tab 04/02/21  

Unknown Rx





875 mg]     


 


Ibuprofen [Motrin 600 MG tab] 600 mg PO Q8H PRN #30 tablet 04/02/21  Unknown Rx














ED Physical Exam





- General


Limitations: No Limitations


General appearance: alert, in no apparent distress





- Head


Head exam: Present: atraumatic, normocephalic





- Eye


Eye exam: Present: normal appearance





- Expanded ENT Exam


  ** Expanded


Throat exam: Positive: tonsillar erythema





- Neck


Neck exam: Present: tenderness, full ROM





- Respiratory


Respiratory exam: Present: normal lung sounds bilaterally.  Absent: respiratory 

distress, accessory muscle use





- Cardiovascular


Cardiovascular Exam: Present: regular rate





- Neurological Exam


Neurological exam: Present: alert, oriented X3, normal gait





- Psychiatric


Psychiatric exam: Present: normal affect, normal mood





- Skin


Skin exam: Present: warm, dry, intact, normal color.  Absent: rash





ED Course





                                   Vital Signs











  04/02/21





  03:40


 


Temperature 98.6 F


 


Pulse Rate 67


 


Respiratory 18





Rate 


 


Blood Pressure 115/72


 


O2 Sat by Pulse 100





Oximetry 














ED Medical Decision Making





- Medical Decision Making





26-year-old -American female presents to the emergency room complaining 

of right-sided mouth and jaw pain since about 10 PM yesterday.  Patient states 

she is aware that she has poor dental care.  She denies any fever no chills no 

nausea no vomiting.  Patient taking nothing prior to arrival for her pain.  


Critical care attestation.: 


If time is entered above; I have spent that time in minutes in the direct care 

of this critically ill patient, excluding procedure time.








ED Disposition


Clinical Impression: 


 Pharyngitis





Disposition: DC-01 TO HOME OR SELFCARE


Is pt being admited?: No


Does the pt Need Aspirin: No


Condition: Stable


Instructions:  Pharyngitis, Easy-to-Read


Additional Instructions: 


Complete antibiotics as prescribed.  Pain medication as needed.  Increase your 

fluid intake advance your diet as tolerated follow-up with your primary care 

provider.


Prescriptions: 


Amoxicillin/K Clav Tab [Augmentin 875 mg] 1 tab PO Q12HR 7 Days #14 tab


Ibuprofen [Motrin 600 MG tab] 600 mg PO Q8H PRN #30 tablet


 PRN Reason: Pain


Referrals: 


[facility] [Provider Group] - 3-5 Days


Forms:  Work/School Release Form(ED)

## 2021-11-25 ENCOUNTER — HOSPITAL ENCOUNTER (EMERGENCY)
Dept: HOSPITAL 5 - ED | Age: 26
Discharge: HOME | End: 2021-11-25
Payer: MEDICAID

## 2021-11-25 VITALS — SYSTOLIC BLOOD PRESSURE: 119 MMHG | DIASTOLIC BLOOD PRESSURE: 81 MMHG

## 2021-11-25 DIAGNOSIS — M79.5: Primary | ICD-10-CM

## 2021-11-25 DIAGNOSIS — J45.909: ICD-10-CM

## 2021-11-25 DIAGNOSIS — F17.200: ICD-10-CM

## 2021-11-25 DIAGNOSIS — F12.90: ICD-10-CM

## 2021-11-25 PROCEDURE — 99283 EMERGENCY DEPT VISIT LOW MDM: CPT
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
